# Patient Record
Sex: MALE | Race: WHITE
[De-identification: names, ages, dates, MRNs, and addresses within clinical notes are randomized per-mention and may not be internally consistent; named-entity substitution may affect disease eponyms.]

---

## 2022-07-25 ENCOUNTER — HOSPITAL ENCOUNTER (EMERGENCY)
Dept: HOSPITAL 94 - ER | Age: 42
Discharge: HOME | End: 2022-07-25
Payer: MEDICAID

## 2022-07-25 VITALS — WEIGHT: 175.38 LBS | HEIGHT: 72 IN | BODY MASS INDEX: 23.75 KG/M2

## 2022-07-25 VITALS — SYSTOLIC BLOOD PRESSURE: 110 MMHG | DIASTOLIC BLOOD PRESSURE: 75 MMHG

## 2022-07-25 DIAGNOSIS — G89.29: ICD-10-CM

## 2022-07-25 DIAGNOSIS — Z59.00: ICD-10-CM

## 2022-07-25 DIAGNOSIS — M54.50: ICD-10-CM

## 2022-07-25 DIAGNOSIS — Z56.0: ICD-10-CM

## 2022-07-25 DIAGNOSIS — F15.20: ICD-10-CM

## 2022-07-25 DIAGNOSIS — Z00.00: Primary | ICD-10-CM

## 2022-07-25 PROCEDURE — 99283 EMERGENCY DEPT VISIT LOW MDM: CPT

## 2022-07-25 PROCEDURE — 93005 ELECTROCARDIOGRAM TRACING: CPT

## 2022-07-25 SDOH — ECONOMIC STABILITY - INCOME SECURITY: UNEMPLOYMENT, UNSPECIFIED: Z56.0

## 2022-07-25 SDOH — ECONOMIC STABILITY - HOUSING INSECURITY: HOMELESSNESS UNSPECIFIED: Z59.00

## 2022-12-06 ENCOUNTER — HOSPITAL ENCOUNTER (EMERGENCY)
Dept: HOSPITAL 94 - ER | Age: 42
Discharge: HOME | End: 2022-12-06
Payer: MEDICAID

## 2022-12-06 VITALS — SYSTOLIC BLOOD PRESSURE: 105 MMHG | DIASTOLIC BLOOD PRESSURE: 65 MMHG

## 2022-12-06 VITALS — WEIGHT: 170.35 LBS | HEIGHT: 72 IN | BODY MASS INDEX: 23.07 KG/M2

## 2022-12-06 DIAGNOSIS — Z72.89: ICD-10-CM

## 2022-12-06 DIAGNOSIS — Z56.0: ICD-10-CM

## 2022-12-06 DIAGNOSIS — F19.90: ICD-10-CM

## 2022-12-06 DIAGNOSIS — R00.0: ICD-10-CM

## 2022-12-06 DIAGNOSIS — Z79.899: ICD-10-CM

## 2022-12-06 DIAGNOSIS — Z59.00: ICD-10-CM

## 2022-12-06 DIAGNOSIS — Z60.2: ICD-10-CM

## 2022-12-06 DIAGNOSIS — F11.23: Primary | ICD-10-CM

## 2022-12-06 DIAGNOSIS — Z79.2: ICD-10-CM

## 2022-12-06 DIAGNOSIS — F15.90: ICD-10-CM

## 2022-12-06 DIAGNOSIS — G89.29: ICD-10-CM

## 2022-12-06 DIAGNOSIS — Z86.19: ICD-10-CM

## 2022-12-06 PROCEDURE — 99284 EMERGENCY DEPT VISIT MOD MDM: CPT

## 2022-12-06 SDOH — SOCIAL STABILITY - SOCIAL INSECURITY: PROBLEMS RELATED TO LIVING ALONE: Z60.2

## 2022-12-06 SDOH — ECONOMIC STABILITY - HOUSING INSECURITY: HOMELESSNESS UNSPECIFIED: Z59.00

## 2022-12-06 SDOH — ECONOMIC STABILITY - INCOME SECURITY: UNEMPLOYMENT, UNSPECIFIED: Z56.0

## 2022-12-06 NOTE — NUR
Met with patient after him being seen at [facility] and dosed with Suboxone to soon 
after his Methadone dose on Sunday. Patient is having symptoms of being in pre 
anticipated withdrawal. I spoke to Reji and he will see patient.

## 2022-12-06 NOTE — NUR
Made patient an appointment for Dec 19th at [facility] in [place] at the 
[facility].